# Patient Record
Sex: MALE | Race: WHITE | NOT HISPANIC OR LATINO | Employment: FULL TIME | URBAN - NONMETROPOLITAN AREA
[De-identification: names, ages, dates, MRNs, and addresses within clinical notes are randomized per-mention and may not be internally consistent; named-entity substitution may affect disease eponyms.]

---

## 2022-06-15 ENCOUNTER — OFFICE VISIT (OUTPATIENT)
Dept: FAMILY MEDICINE CLINIC | Facility: CLINIC | Age: 37
End: 2022-06-15

## 2022-06-15 VITALS
HEIGHT: 65 IN | OXYGEN SATURATION: 97 % | SYSTOLIC BLOOD PRESSURE: 124 MMHG | WEIGHT: 194 LBS | DIASTOLIC BLOOD PRESSURE: 80 MMHG | HEART RATE: 74 BPM | BODY MASS INDEX: 32.32 KG/M2

## 2022-06-15 DIAGNOSIS — G89.29 CHRONIC MIDLINE LOW BACK PAIN WITHOUT SCIATICA: Primary | ICD-10-CM

## 2022-06-15 DIAGNOSIS — M54.50 CHRONIC MIDLINE LOW BACK PAIN WITHOUT SCIATICA: Primary | ICD-10-CM

## 2022-06-15 PROCEDURE — 99213 OFFICE O/P EST LOW 20 MIN: CPT | Performed by: PHYSICIAN ASSISTANT

## 2022-06-15 RX ORDER — METHOCARBAMOL 500 MG/1
500 TABLET, FILM COATED ORAL 3 TIMES DAILY
Qty: 90 TABLET | Refills: 3 | Status: SHIPPED | OUTPATIENT
Start: 2022-06-15

## 2022-06-15 RX ORDER — SERTRALINE HYDROCHLORIDE 100 MG/1
100 TABLET, FILM COATED ORAL DAILY
COMMUNITY
End: 2022-10-24

## 2022-06-15 RX ORDER — TRAZODONE HYDROCHLORIDE 50 MG/1
50 TABLET ORAL NIGHTLY
COMMUNITY
End: 2023-01-20 | Stop reason: SDUPTHER

## 2022-06-15 RX ORDER — METHYLPREDNISOLONE 4 MG/1
TABLET ORAL
Qty: 21 TABLET | Refills: 0 | Status: SHIPPED | OUTPATIENT
Start: 2022-06-15

## 2022-06-15 RX ORDER — MONTELUKAST SODIUM 10 MG/1
10 TABLET ORAL NIGHTLY
COMMUNITY

## 2022-06-15 RX ORDER — MELOXICAM 15 MG/1
15 TABLET ORAL DAILY
Qty: 30 TABLET | Refills: 1 | Status: SHIPPED | OUTPATIENT
Start: 2022-06-15

## 2022-06-15 NOTE — PROGRESS NOTES
"Chief Complaint  Back Pain (Patient complains of low back pain for the last 3 wks.)    Subjective          Jerry Pyle presents to [facility]  History of Present Illness  Patient has had back pain off and on for years, it is across his lower back and it is aggravated by lifting or excessive activity, [facility] that [facility] has \"spurs in his back\"      Objective     Vital Signs:   /80 (BP Location: Right arm, Patient Position: Sitting, Cuff Size: Adult)   Pulse 74   Ht 165.1 cm (65\")   Wt 88 kg (194 lb)   SpO2 97%   BMI 32.28 kg/m²     Body mass index is 32.28 kg/m².    Review of Systems   Constitutional: Negative.    Respiratory: Negative.    Cardiovascular: Negative.    Musculoskeletal: Positive for back pain (across the lower back, worse with movement. ).   Neurological: Negative.    Psychiatric/Behavioral: Negative.        Social History: reports that he has never smoked. He has never used smokeless tobacco. He reports previous alcohol use. He reports that he does not use drugs.      Current Outpatient Medications:   •  montelukast (SINGULAIR) 10 MG tablet, Take 10 mg by mouth Every Night., Disp: , Rfl:   •  sertraline (ZOLOFT) 100 MG tablet, Take 100 mg by mouth Daily., Disp: , Rfl:   •  traZODone (DESYREL) 50 MG tablet, Take 50 mg by mouth Every Night., Disp: , Rfl:   •  meloxicam (MOBIC) 15 MG tablet, Take 1 tablet by mouth Daily., Disp: 30 tablet, Rfl: 1  •  methocarbamol (Robaxin) 500 MG tablet, Take 1 tablet by mouth 3 (Three) Times a Day., Disp: 90 tablet, Rfl: 3  •  methylPREDNISolone (MEDROL) 4 MG dose pack, Take as directed on package instructions., Disp: 21 tablet, Rfl: 0    Allergies: Patient has no known allergies.    Physical Exam  Constitutional:       Appearance: Normal appearance.   Musculoskeletal:         General: Tenderness present.      Lumbar back: Tenderness and bony tenderness present. No swelling, edema or spasms. Decreased range of " motion. Negative right straight leg raise test and negative left straight leg raise test.   Neurological:      General: No focal deficit present.      Mental Status: He is alert and oriented to person, place, and time.   Psychiatric:         Mood and Affect: Mood normal.         Behavior: Behavior normal.             Assessment and Plan   Diagnoses and all orders for this visit:    1. Chronic midline low back pain without sciatica (Primary)  Assessment & Plan:  Most likely underlying DDD.  I suggested xray but he prefers to get some treatment today and [facility] and [facility] as well as specific diagnosis.   I am giving him a medrol dose pack, some meloxicam and methocarbamol to ease the discomfort and he will [facility] the [facility] for further [facility] and diagnosis.         Other orders  -     methylPREDNISolone (MEDROL) 4 MG dose pack; Take as directed on package instructions.  Dispense: 21 tablet; Refill: 0  -     meloxicam (MOBIC) 15 MG tablet; Take 1 tablet by mouth Daily.  Dispense: 30 tablet; Refill: 1  -     methocarbamol (Robaxin) 500 MG tablet; Take 1 tablet by mouth 3 (Three) Times a Day.  Dispense: 90 tablet; Refill: 3          Follow Up   Return if symptoms worsen or fail to improve.  Patient was given instructions and counseling regarding his condition or for health maintenance advice. Please see specific information pulled into the AVS if appropriate.     Tamiko Michael PA-C

## 2022-06-15 NOTE — ASSESSMENT & PLAN NOTE
Most likely underlying DDD.  I suggested xray but he prefers to get some treatment today and he will [facility] for [facility] and evaluation as well as specific diagnosis.   I am giving him a medrol dose pack, some meloxicam and methocarbamol to ease the discomfort and he will [facility] for further evaluation and diagnosis.

## 2022-10-24 RX ORDER — SERTRALINE HYDROCHLORIDE 100 MG/1
TABLET, FILM COATED ORAL
Qty: 135 TABLET | Refills: 0 | Status: SHIPPED | OUTPATIENT
Start: 2022-10-24

## 2023-01-20 RX ORDER — TRAZODONE HYDROCHLORIDE 50 MG/1
50 TABLET ORAL NIGHTLY
Qty: 30 TABLET | Refills: 3 | Status: SHIPPED | OUTPATIENT
Start: 2023-01-20